# Patient Record
Sex: MALE | Race: ASIAN | NOT HISPANIC OR LATINO | URBAN - METROPOLITAN AREA
[De-identification: names, ages, dates, MRNs, and addresses within clinical notes are randomized per-mention and may not be internally consistent; named-entity substitution may affect disease eponyms.]

---

## 2020-10-25 ENCOUNTER — EMERGENCY (EMERGENCY)
Facility: HOSPITAL | Age: 60
LOS: 1 days | Discharge: HOME | End: 2020-10-25
Attending: EMERGENCY MEDICINE | Admitting: EMERGENCY MEDICINE
Payer: COMMERCIAL

## 2020-10-25 VITALS
TEMPERATURE: 99 F | SYSTOLIC BLOOD PRESSURE: 112 MMHG | OXYGEN SATURATION: 100 % | DIASTOLIC BLOOD PRESSURE: 78 MMHG | HEART RATE: 102 BPM | RESPIRATION RATE: 18 BRPM

## 2020-10-25 VITALS
RESPIRATION RATE: 16 BRPM | DIASTOLIC BLOOD PRESSURE: 76 MMHG | SYSTOLIC BLOOD PRESSURE: 117 MMHG | TEMPERATURE: 98 F | HEART RATE: 76 BPM | OXYGEN SATURATION: 100 %

## 2020-10-25 LAB
ALBUMIN SERPL ELPH-MCNC: 4.6 G/DL — SIGNIFICANT CHANGE UP (ref 3.3–5)
ALP SERPL-CCNC: 101 U/L — SIGNIFICANT CHANGE UP (ref 40–120)
ALT FLD-CCNC: 27 U/L — SIGNIFICANT CHANGE UP (ref 4–41)
ANION GAP SERPL CALC-SCNC: 13 MMO/L — SIGNIFICANT CHANGE UP (ref 7–14)
AST SERPL-CCNC: 17 U/L — SIGNIFICANT CHANGE UP (ref 4–40)
BASOPHILS # BLD AUTO: 0.04 K/UL — SIGNIFICANT CHANGE UP (ref 0–0.2)
BASOPHILS NFR BLD AUTO: 0.4 % — SIGNIFICANT CHANGE UP (ref 0–2)
BILIRUB SERPL-MCNC: 1.1 MG/DL — SIGNIFICANT CHANGE UP (ref 0.2–1.2)
BUN SERPL-MCNC: 24 MG/DL — HIGH (ref 7–23)
CALCIUM SERPL-MCNC: 9.3 MG/DL — SIGNIFICANT CHANGE UP (ref 8.4–10.5)
CHLORIDE SERPL-SCNC: 104 MMOL/L — SIGNIFICANT CHANGE UP (ref 98–107)
CO2 SERPL-SCNC: 22 MMOL/L — SIGNIFICANT CHANGE UP (ref 22–31)
CREAT SERPL-MCNC: 1.32 MG/DL — HIGH (ref 0.5–1.3)
EOSINOPHIL # BLD AUTO: 0.02 K/UL — SIGNIFICANT CHANGE UP (ref 0–0.5)
EOSINOPHIL NFR BLD AUTO: 0.2 % — SIGNIFICANT CHANGE UP (ref 0–6)
GLUCOSE SERPL-MCNC: 98 MG/DL — SIGNIFICANT CHANGE UP (ref 70–99)
HCT VFR BLD CALC: 48.8 % — SIGNIFICANT CHANGE UP (ref 39–50)
HGB BLD-MCNC: 15.7 G/DL — SIGNIFICANT CHANGE UP (ref 13–17)
IMM GRANULOCYTES NFR BLD AUTO: 0.3 % — SIGNIFICANT CHANGE UP (ref 0–1.5)
LYMPHOCYTES # BLD AUTO: 2.77 K/UL — SIGNIFICANT CHANGE UP (ref 1–3.3)
LYMPHOCYTES # BLD AUTO: 28.2 % — SIGNIFICANT CHANGE UP (ref 13–44)
MCHC RBC-ENTMCNC: 28.5 PG — SIGNIFICANT CHANGE UP (ref 27–34)
MCHC RBC-ENTMCNC: 32.2 % — SIGNIFICANT CHANGE UP (ref 32–36)
MCV RBC AUTO: 88.7 FL — SIGNIFICANT CHANGE UP (ref 80–100)
MONOCYTES # BLD AUTO: 0.53 K/UL — SIGNIFICANT CHANGE UP (ref 0–0.9)
MONOCYTES NFR BLD AUTO: 5.4 % — SIGNIFICANT CHANGE UP (ref 2–14)
NEUTROPHILS # BLD AUTO: 6.44 K/UL — SIGNIFICANT CHANGE UP (ref 1.8–7.4)
NEUTROPHILS NFR BLD AUTO: 65.5 % — SIGNIFICANT CHANGE UP (ref 43–77)
NRBC # FLD: 0 K/UL — SIGNIFICANT CHANGE UP (ref 0–0)
PLATELET # BLD AUTO: 252 K/UL — SIGNIFICANT CHANGE UP (ref 150–400)
PMV BLD: 10.6 FL — SIGNIFICANT CHANGE UP (ref 7–13)
POTASSIUM SERPL-MCNC: 4.2 MMOL/L — SIGNIFICANT CHANGE UP (ref 3.5–5.3)
POTASSIUM SERPL-SCNC: 4.2 MMOL/L — SIGNIFICANT CHANGE UP (ref 3.5–5.3)
PROT SERPL-MCNC: 7.6 G/DL — SIGNIFICANT CHANGE UP (ref 6–8.3)
RBC # BLD: 5.5 M/UL — SIGNIFICANT CHANGE UP (ref 4.2–5.8)
RBC # FLD: 12.7 % — SIGNIFICANT CHANGE UP (ref 10.3–14.5)
SODIUM SERPL-SCNC: 139 MMOL/L — SIGNIFICANT CHANGE UP (ref 135–145)
TROPONIN T, HIGH SENSITIVITY: 7 NG/L — SIGNIFICANT CHANGE UP (ref ?–14)
TROPONIN T, HIGH SENSITIVITY: 7 NG/L — SIGNIFICANT CHANGE UP (ref ?–14)
WBC # BLD: 9.83 K/UL — SIGNIFICANT CHANGE UP (ref 3.8–10.5)
WBC # FLD AUTO: 9.83 K/UL — SIGNIFICANT CHANGE UP (ref 3.8–10.5)

## 2020-10-25 PROCEDURE — 70498 CT ANGIOGRAPHY NECK: CPT | Mod: 26

## 2020-10-25 PROCEDURE — 99285 EMERGENCY DEPT VISIT HI MDM: CPT | Mod: 25

## 2020-10-25 PROCEDURE — 70496 CT ANGIOGRAPHY HEAD: CPT | Mod: 26

## 2020-10-25 PROCEDURE — 93010 ELECTROCARDIOGRAM REPORT: CPT

## 2020-10-25 RX ORDER — ATORVASTATIN CALCIUM 80 MG/1
1 TABLET, FILM COATED ORAL
Qty: 20 | Refills: 0
Start: 2020-10-25 | End: 2020-11-13

## 2020-10-25 RX ORDER — AZITHROMYCIN 500 MG/1
500 TABLET, FILM COATED ORAL ONCE
Refills: 0 | Status: DISCONTINUED | OUTPATIENT
Start: 2020-10-25 | End: 2020-10-25

## 2020-10-25 RX ORDER — CEFTRIAXONE 500 MG/1
1000 INJECTION, POWDER, FOR SOLUTION INTRAMUSCULAR; INTRAVENOUS ONCE
Refills: 0 | Status: DISCONTINUED | OUTPATIENT
Start: 2020-10-25 | End: 2020-10-25

## 2020-10-25 NOTE — ED PROVIDER NOTE - ATTENDING CONTRIBUTION TO CARE
Patient is a 59 yo M with history of HTN here for evaluation of left facial numbness and headache that started around 4 AM today. He states he felt a pulling sensation in his face, got up Patient is a 61 yo M with history of HTN here for evaluation of left facial numbness and headache that started around 4 AM today. He states he felt a pulling sensation in his face, got up and decided to go outside and run to get his blood flowing. Denies weakness in arms or legs. No change in speech or gait. He reports he doesn't usually get headaches. No vision changes. No fevers, chills, nausea, vomiting.     VS noted  Gen. no acute distress, Non toxic   HEENT: EOMI, mmm  Lungs: CTAB/L no C/ W /R   CVS: RRR   Abd; Soft non tender, non distended   Ext: no edema  Skin: no rash  Neuro AAOx3, CN 2-12 intact, strength equal b/l, gait normal, clear speech  a/p: facial droop? headache, pt with normal neuro exam. low suspicion for TIA/ CVA however patient is poor historian, cannot adequately say why he is on plavix. plan for ct head, cta head and neck. will check blood work to r/o electrolyte abnormalities.   - Karyna WAKEFIELD

## 2020-10-25 NOTE — ED ADULT TRIAGE NOTE - CHIEF COMPLAINT QUOTE
Pt reports having a left sided headache and feeling numbness to the left side of his starting at 4am when he woke up this morning (went to bed at 11pm last night). States the numbness is currently "less than it was before." No other neuro deficits noted in triage. States he also has had palpitations and left shoulder pain in the past few days as well. Hx of HTN. Dr. Fuentes called for code stroke eval. No code stroke called.

## 2020-10-25 NOTE — ED PROVIDER NOTE - PATIENT PORTAL LINK FT
You can access the FollowMyHealth Patient Portal offered by [facility] by registering at the following website: [email or web address]. By joining [facility]’s FollowMyHealth portal, you will also be able to view your health information using other applications (apps) compatible with our system.

## 2020-10-25 NOTE — CONSULT NOTE ADULT - ASSESSMENT
Patient is a 59yo M with pmhx of HTN who presented to the ED and neurology was consulted for complaint of headache. Onset of L sided headache followed by L facial numbness. Patient denies any associated vision changes or tinnitus. No hx of similar episodes or stroke. Symptoms improved with exercise. Patient reports resolution of all symptoms. Neuro exam is normal and non-focal. CT findings as below indicate intracranial athero.  L sided headache followed by numbness likely migraine. Low suspicion for ischemic intracranial pathology. L sided intracranial athero would not correlate with L sided symptoms. NIHSS 0. ABCD2 score 3, low risk for stroke    CT brain: There is no acute infarction, intracranial hemorrhage or mass effect.  CTA brain: There is approximately 40-50 % narrowing of the proximal M1 portion of the left MCA. There is no large vessel occlusion or aneurysm involving major proximal intracranial arteries.  CTA NECK: There is no stenosis involving major neck arteries.    Recommendations   - Tylenol prn headache   - No need for ASA at this time   - HbA1C, Lipid panel  - Atorvastatin 80mg, titrate for LDL < 70    Case to be discussed with neurology attending Dr. Holbrook Patient is a 61yo M with pmhx of HTN who presented to the ED and neurology was consulted for complaint of headache. Onset of L sided headache followed by L facial numbness. Patient denies any associated vision changes or tinnitus. No hx of similar episodes or stroke. Symptoms improved with exercise. Patient reports resolution of all symptoms. Neuro exam is normal and non-focal. CT findings as below indicate intracranial athero.  L sided headache followed by numbness likely migraine. Low suspicion for ischemic intracranial pathology. L sided intracranial athero would not correlate with L sided symptoms. NIHSS 0. ABCD2 score 3, low risk for stroke    CT brain: There is no acute infarction, intracranial hemorrhage or mass effect.  CTA brain: There is approximately 40-50 % narrowing of the proximal M1 portion of the left MCA. There is no large vessel occlusion or aneurysm involving major proximal intracranial arteries.  CTA NECK: There is no stenosis involving major neck arteries.    Recommendations   - Tylenol prn headache   - Would not start ASA at this time - can discuss with outpt neurology  - HbA1C, Lipid panel  - Atorvastatin 80mg, titrate for LDL < 70  - MRI brain without contrast - can be done outpatient  - Follow up with neurology outpatient. Dr Domínguez or Dr. Ledbetter [phone] WITHIN 1 week    Case discussed with neurology attending Dr. Holbrook

## 2020-10-25 NOTE — CONSULT NOTE ADULT - NSHPATTENDINGPLANDISCUSS_GEN_ALL_CORE
neurology on call resident on telephone and I agree with assessment and plan as outlined and patient feeling back to normal and will be started on statin and follow up with neurologist within 1 week.

## 2020-10-25 NOTE — CONSULT NOTE ADULT - SUBJECTIVE AND OBJECTIVE BOX
HPI:  Patient is a 59yo M with pmhx of HTN who presented to the ED and neurology was consulted for complaint of headache. Patient reports onset of L sided headache around 4am today. He was not woken up by it but states that he noticed it when he woke. Patient decided to go back to sleep. When he woke again at 7am he noticed L sided head and facial numbness with associated L neck pain. Patient states that he checked his blood pressure at home and it was normal. Patient decided to go for a run "to increase his blood flow" and states that his symptoms improved with exercise. He denies hx of similar symptoms or stroke. He denies hx of headache. Patient denies any associated vision changes, tinnitus, nausea, cp, sob, n/t or weakness of extremities. Patient states that symptoms were not remitted or exacerbated by anything else. He did not try any pain medication like tylenol or aleve. He states that he took 2 ASA 81mg when symptoms started. Symptoms persisted until 11am. He states that all of his symptoms have resolved now.     ROS: A 10-system ROS was performed and is negative except for those items noted above and/or in the HPI.    PAST MEDICAL & SURGICAL HISTORY:  Hypertension    No significant past surgical history      FAMILY HISTORY:  No pertinent family history in first degree relatives        SOCIAL HISTORY: SOCIAL HISTORY:     Marital Status: (  )   (  ) Single  (  )   (  )      Occupation:      Lives: (  ) alone  (  ) with children   (  ) with spouse  (  ) with parents  (  ) other     Illicit Drug Use: (  ) never used  (  ) other _____     Tobacco Use:  (  ) never smoked  (  ) former smoker  (  ) current smoker  (  ) pack year  (  ) last cigarette date     Alcohol Use:      Sexual History:        MEDICATIONS  Home Medications:      MEDICATIONS  (STANDING):    MEDICATIONS  (PRN):      ALLERGIES/INTOLERANCES:  Allergies  No Known Allergies    Intolerances      OBJECTIVE:  VITALS   Vital Signs Last 24 Hrs  T(C): 36.4 (25 Oct 2020 18:01), Max: 37.1 (25 Oct 2020 16:05)  T(F): 97.6 (25 Oct 2020 18:01), Max: 98.8 (25 Oct 2020 16:05)  HR: 76 (25 Oct 2020 18:01) (76 - 102)  BP: 117/76 (25 Oct 2020 18:01) (112/78 - 117/76)  BP(mean): --  RR: 16 (25 Oct 2020 18:01) (16 - 18)  SpO2: 100% (25 Oct 2020 18:01) (100% - 100%)    PHYSICAL EXAM:    General:  Constitutional: Obese Male, appears stated age, in no apparent distress including pain  Head: Normocephalic & atraumatic.    Neurological (>12):  MS: Awake, alert, oriented to person, place, situation, time. Normal affect. Follows all commands.    Language: Speech is clear, fluent with good repetition & comprehension (able to name objects: thumb, watch, glasses)    CNs: PERRLA (R = 4mm, L = 4mm). VFF. EOMI no nystagmus, no diplopia. V1-3 intact to LT b/l. No facial asymmetry b/l, full eye closure strength b/l. Hearing grossly normal (rubbing fingers) b/l. Symmetric palate elevation in midline. Gag reflex deferred. Head turning & shoulder shrug intact b/l. Tongue midline, normal movements, no atrophy.    Motor: Normal muscle bulk & tone. No noticeable tremor or seizure. No pronator drift.              Deltoid	Biceps	Triceps	   R	5	5	5	5	 	  L	5	5	5	5	    	H-Flex	K-Flex	K-Ext	D-Flex	P-Flex  R	5	5	5	5	5	  L	5	5	5	5	5		     Sensation: Intact to LT b/l throughout.     Cortical: Extinction on DSS (neglect): none    Reflexes:              Biceps(C5)       BR(C6)     Triceps(C7)               Patellar(L4)    Achilles(S1)    Plantar Resp  R	2	          2	             2		        2		    2		Down   L	2	          2	             2		        2		    2		Down     Coordination: No dysmetria to FTN    Gait: Normal Romberg. No postural instability. Normal stance and tandem gait.     LABORATORY:  CBC                       15.7   9.83  )-----------( 252      ( 25 Oct 2020 17:16 )             48.8     Chem 10-25    139  |  104  |  24<H>  ----------------------------<  98  4.2   |  22  |  1.32<H>    Ca    9.3      25 Oct 2020 17:16    TPro  7.6  /  Alb  4.6  /  TBili  1.1  /  DBili  x   /  AST  17  /  ALT  27  /  AlkPhos  101  10-25    LFTs LIVER FUNCTIONS - ( 25 Oct 2020 17:16 )  Alb: 4.6 g/dL / Pro: 7.6 g/dL / ALK PHOS: 101 u/L / ALT: 27 u/L / AST: 17 u/L / GGT: x

## 2020-10-25 NOTE — ED PROVIDER NOTE - OBJECTIVE STATEMENT
61 Y/O M PHN HTN C?O L sided HA that began at 4Am as well as tingling and spasm of the face. Pt denies arm or leg sensation. 59 Y/O M PHN HTN C/O L sided HA that began at 4Am as well as tingling and spasm of the face. Pt denies arm or leg sensation. Pt states he went to sleep feeling well at 11Pm. Pt states he took 2 aspirin and went for a run after experiencing the headache to "increase the bloodflow to the brain." Pt states the spasms to the L side of his face have improved and the headache has since resolved. Pt denies any other sx or acute complaints.

## 2020-10-25 NOTE — ED PROVIDER NOTE - CLINICAL SUMMARY MEDICAL DECISION MAKING FREE TEXT BOX
61 Y/O M PHN HTN C/O L sided HA that began at 4Am as well as tingling and spasm of the face. Pt denies arm or leg sensation. Pt states he went to sleep feeling well at 11Pm. Pt states he took 2 aspirin and went for a run after experiencing the headache to "increase the bloodflow to the brain."  Plan is CTA to R/O CVA or flow limiting stenosis. Pt is stable and well appearing.

## 2020-10-25 NOTE — ED PROVIDER NOTE - NSFOLLOWUPINSTRUCTIONS_ED_ALL_ED_FT
- Lab and imaging results, if performed, were discussed with you along with your discharge diagnosis    - Return to the ED for any new, worsening, or concerning symptoms to you including reoccurrence of your symptoms     - Continue all prescribed medications    -Neurology recommends outpatient MRI brain without contrast   - Please call to schedule follow up with neurology as outpatient with Dr Domínguez or Dr. Ledbetter, you may call [phone] to schedule an appointment WITHIN 1 week.     - Rest and keep yourself hydrated with fluids

## 2020-10-25 NOTE — ED ADULT NURSE REASSESSMENT NOTE - NS ED NURSE REASSESS COMMENT FT1
Received report from NITA Guzman. Pt is A&Ox4, ambulatory. Breathing even and unlabored. No complaints of chest pain, headache, nausea, dizziness, vomiting  SOB, fever or chills at this time. Waiting for neuro. Will continue to monitor.

## 2020-10-25 NOTE — ED ADULT NURSE NOTE - OBJECTIVE STATEMENT
pt c/o waking 4 am in morning with left sided headache that h relieved with exercise/ and facial tremor, denies weakness of any kind.  iv placed in rt ac labs sent off/ pt ambulatory at baseline/

## 2020-10-25 NOTE — ED PROVIDER NOTE - PROGRESS NOTE DETAILS
Luiz ISRAEL (PGY-2): Consulted Neuro and discussed CTA findings of narrowing of M1 region of L MCA with neuro and patient, pending neuro to see, patient remains asymptomatic on reassessment. Plan for likely dc home with further outpatient w/u with MRI. Luiz ISRAEL (PGY-2): Neuro recs outpatient MRI brain without contrast, and follow-up with neurology outpatient w/ Dr Domínguez or Dr. Ledbetter in one week.

## 2020-12-03 ENCOUNTER — EMERGENCY (EMERGENCY)
Facility: HOSPITAL | Age: 60
LOS: 1 days | Discharge: HOME | End: 2020-12-03
Attending: EMERGENCY MEDICINE | Admitting: EMERGENCY MEDICINE
Payer: COMMERCIAL

## 2020-12-03 VITALS
DIASTOLIC BLOOD PRESSURE: 82 MMHG | WEIGHT: 175.05 LBS | RESPIRATION RATE: 17 BRPM | SYSTOLIC BLOOD PRESSURE: 150 MMHG | HEART RATE: 95 BPM | HEIGHT: 68 IN | OXYGEN SATURATION: 99 % | TEMPERATURE: 98 F

## 2020-12-03 PROBLEM — I10 ESSENTIAL (PRIMARY) HYPERTENSION: Chronic | Status: ACTIVE | Noted: 2020-10-25

## 2020-12-03 LAB
ALBUMIN SERPL ELPH-MCNC: 4.5 G/DL — SIGNIFICANT CHANGE UP (ref 3.3–5)
ALP SERPL-CCNC: 101 U/L — SIGNIFICANT CHANGE UP (ref 40–120)
ALT FLD-CCNC: 22 U/L — SIGNIFICANT CHANGE UP (ref 4–41)
ANION GAP SERPL CALC-SCNC: 15 MMO/L — HIGH (ref 7–14)
AST SERPL-CCNC: 15 U/L — SIGNIFICANT CHANGE UP (ref 4–40)
BASOPHILS # BLD AUTO: 0.02 K/UL — SIGNIFICANT CHANGE UP (ref 0–0.2)
BASOPHILS NFR BLD AUTO: 0.2 % — SIGNIFICANT CHANGE UP (ref 0–2)
BILIRUB SERPL-MCNC: 1.6 MG/DL — HIGH (ref 0.2–1.2)
BUN SERPL-MCNC: 16 MG/DL — SIGNIFICANT CHANGE UP (ref 7–23)
CALCIUM SERPL-MCNC: 9.5 MG/DL — SIGNIFICANT CHANGE UP (ref 8.4–10.5)
CHLORIDE SERPL-SCNC: 103 MMOL/L — SIGNIFICANT CHANGE UP (ref 98–107)
CO2 SERPL-SCNC: 22 MMOL/L — SIGNIFICANT CHANGE UP (ref 22–31)
CREAT SERPL-MCNC: 1.03 MG/DL — SIGNIFICANT CHANGE UP (ref 0.5–1.3)
EOSINOPHIL # BLD AUTO: 0.01 K/UL — SIGNIFICANT CHANGE UP (ref 0–0.5)
EOSINOPHIL NFR BLD AUTO: 0.1 % — SIGNIFICANT CHANGE UP (ref 0–6)
GLUCOSE SERPL-MCNC: 112 MG/DL — HIGH (ref 70–99)
HCT VFR BLD CALC: 46.2 % — SIGNIFICANT CHANGE UP (ref 39–50)
HGB BLD-MCNC: 15.3 G/DL — SIGNIFICANT CHANGE UP (ref 13–17)
IMM GRANULOCYTES NFR BLD AUTO: 0.1 % — SIGNIFICANT CHANGE UP (ref 0–1.5)
LIDOCAIN IGE QN: 21.5 U/L — SIGNIFICANT CHANGE UP (ref 7–60)
LYMPHOCYTES # BLD AUTO: 2.19 K/UL — SIGNIFICANT CHANGE UP (ref 1–3.3)
LYMPHOCYTES # BLD AUTO: 25.8 % — SIGNIFICANT CHANGE UP (ref 13–44)
MCHC RBC-ENTMCNC: 29 PG — SIGNIFICANT CHANGE UP (ref 27–34)
MCHC RBC-ENTMCNC: 33.1 % — SIGNIFICANT CHANGE UP (ref 32–36)
MCV RBC AUTO: 87.7 FL — SIGNIFICANT CHANGE UP (ref 80–100)
MONOCYTES # BLD AUTO: 0.41 K/UL — SIGNIFICANT CHANGE UP (ref 0–0.9)
MONOCYTES NFR BLD AUTO: 4.8 % — SIGNIFICANT CHANGE UP (ref 2–14)
NEUTROPHILS # BLD AUTO: 5.86 K/UL — SIGNIFICANT CHANGE UP (ref 1.8–7.4)
NEUTROPHILS NFR BLD AUTO: 69 % — SIGNIFICANT CHANGE UP (ref 43–77)
NRBC # FLD: 0 K/UL — SIGNIFICANT CHANGE UP (ref 0–0)
PLATELET # BLD AUTO: 255 K/UL — SIGNIFICANT CHANGE UP (ref 150–400)
PMV BLD: 10.3 FL — SIGNIFICANT CHANGE UP (ref 7–13)
POTASSIUM SERPL-MCNC: 3.6 MMOL/L — SIGNIFICANT CHANGE UP (ref 3.5–5.3)
POTASSIUM SERPL-SCNC: 3.6 MMOL/L — SIGNIFICANT CHANGE UP (ref 3.5–5.3)
PROT SERPL-MCNC: 7.6 G/DL — SIGNIFICANT CHANGE UP (ref 6–8.3)
RBC # BLD: 5.27 M/UL — SIGNIFICANT CHANGE UP (ref 4.2–5.8)
RBC # FLD: 13.2 % — SIGNIFICANT CHANGE UP (ref 10.3–14.5)
SODIUM SERPL-SCNC: 140 MMOL/L — SIGNIFICANT CHANGE UP (ref 135–145)
TROPONIN T, HIGH SENSITIVITY: < 6 NG/L — SIGNIFICANT CHANGE UP (ref ?–14)
WBC # BLD: 8.5 K/UL — SIGNIFICANT CHANGE UP (ref 3.8–10.5)
WBC # FLD AUTO: 8.5 K/UL — SIGNIFICANT CHANGE UP (ref 3.8–10.5)

## 2020-12-03 PROCEDURE — 99283 EMERGENCY DEPT VISIT LOW MDM: CPT

## 2020-12-03 RX ORDER — FAMOTIDINE 10 MG/ML
20 INJECTION INTRAVENOUS ONCE
Refills: 0 | Status: COMPLETED | OUTPATIENT
Start: 2020-12-03 | End: 2020-12-03

## 2020-12-03 RX ORDER — SUCRALFATE 1 G
1 TABLET ORAL ONCE
Refills: 0 | Status: COMPLETED | OUTPATIENT
Start: 2020-12-03 | End: 2020-12-03

## 2020-12-03 RX ADMIN — FAMOTIDINE 20 MILLIGRAM(S): 10 INJECTION INTRAVENOUS at 09:55

## 2020-12-03 RX ADMIN — Medication 1 GRAM(S): at 09:56

## 2020-12-03 RX ADMIN — Medication 30 MILLILITER(S): at 09:56

## 2020-12-03 NOTE — ED ADULT NURSE NOTE - OBJECTIVE STATEMENT
Received pt to intake bed 1, A+Ox3, ambulatory. C/O abdominal pain post having a spicy meal last night, pt states he has had episodes of nausea and diarrhea, denies vomiting. pt admits to feeling palpitations with episode. Respirations even and unlabored, normal work of breathing, no accessory muscle use, speaking in full clear uninterrupted sentences. ABD is soft, non tender, non distended with normal active bowel sounds. Pt denies any chest pain, SOB, headache, dizziness, fever, chills. 20G to AC, Labs sent, Medicated as per MD, will continue to monitor.

## 2020-12-03 NOTE — ED PROVIDER NOTE - OBJECTIVE STATEMENT
61 y/o M with PMHx fo GERD, HTN, and HLD scheduled for EGG in 5 days presents to ED after having multiple episodes of post-prandial abdominal pain associated with nausea, palpitations, and a "bitter" taste in mouth. No overt chest pain or SOB during the episodes. Pain is non-migrating and non-exertional. Pt is worried that he may have stomach cancer.

## 2020-12-03 NOTE — ED ADULT TRIAGE NOTE - CHIEF COMPLAINT QUOTE
Pt c/o abdominal pain, nausea, diarrhea and a "bitter" taste in his mouth intermittently X1week. Denies chest pain, SOB, fever, chills.

## 2020-12-03 NOTE — ED PROVIDER NOTE - CLINICAL SUMMARY MEDICAL DECISION MAKING FREE TEXT BOX
61 y/o M presents to ED with signs and symptoms consistent with GERD.  Not consistent with gallbladder disease and very low suspicion for ACS. EKG without any ischemic changes. Plan to check basic labs and treat for GERD symptoms. Pt has follow up for EGG in 5 days.

## 2020-12-03 NOTE — ED PROVIDER NOTE - NSFOLLOWUPINSTRUCTIONS_ED_ALL_ED_FT
1. TAKE ALL OF YOUR PRESCRIBED OR OVER THE COUNTER MEDICATIONS AS DIRECTED.    2. FOR PAIN OR FEVER YOU CAN TAKE IBUPROFEN (MOTRIN, ADVIL), NAPROXEN (ALLEVE) OR ACETAMINOPHEN (TYLENOL) AS NEEDED, AS DIRECTED ON PACKAGING.  3. FOLLOW UP WITH YOUR GASTROENTEROLOGIST FOR YOUR SCOPE PROCEDURE IN 5 DAYS AS SCHEDULED.  4. IF YOU HAD LABS OR IMAGING DONE, YOU WERE GIVEN COPIES OF ALL LABS AND/OR IMAGING RESULTS FROM YOUR ER VISIT--PLEASE TAKE THEM WITH YOU TO YOUR FOLLOW UP APPOINTMENTS.  5. IF NEEDED, CALL PATIENT ACCESS SERVICES AT [phone] TO FIND A PRIMARY CARE PHYSICIAN.  OR CALL [phone] TO MAKE AN APPOINTMENT WITH THE CLINIC.  6. YOU CAN FIND PHYSICIANS OF ALL SPECIALITIES BY VISITING [facility] AND CLICKING ON "FIND A DOCTOR".  7. RETURN TO THE ER FOR ANY WORSENING SYMPTOMS OR CONCERNS.    THANK YOU FOR COMING TO [facility].  HAVE A NICE DAY.

## 2020-12-03 NOTE — ED PROVIDER NOTE - PROGRESS NOTE DETAILS
Dr. Washburn: pt noting feeling significant improvement; CBC WNL, awaiting remainder of labs Dr. Washburn: pt feeling well; labs WNL, no acute findings; trop negative x 1 and sufficient given length of time pain present today; will discharge with instructions to continue taking GI meds that he was prescribed by his doctor, and follow up for his EGD in 5 days as scheduled.  Encouraged small meals.

## 2020-12-03 NOTE — ED PROVIDER NOTE - PATIENT PORTAL LINK FT
You can access the FollowMyHealth Patient Portal offered by [facility] by registering at the following website: [email or web address]. By joining [facility]’s FollowMyHealth portal, you will also be able to view your health information using other applications (apps) compatible with our system.

## 2022-08-24 PROBLEM — K21.9 GASTRO-ESOPHAGEAL REFLUX DISEASE WITHOUT ESOPHAGITIS: Chronic | Status: ACTIVE | Noted: 2020-12-03

## 2022-08-24 PROBLEM — E78.5 HYPERLIPIDEMIA, UNSPECIFIED: Chronic | Status: ACTIVE | Noted: 2020-12-03

## 2022-09-15 PROBLEM — Z00.00 ENCOUNTER FOR PREVENTIVE HEALTH EXAMINATION: Status: ACTIVE | Noted: 2022-09-15

## 2022-09-20 ENCOUNTER — APPOINTMENT (OUTPATIENT)
Dept: GASTROENTEROLOGY | Facility: CLINIC | Age: 62
End: 2022-09-20

## 2022-09-20 VITALS
WEIGHT: 178 LBS | SYSTOLIC BLOOD PRESSURE: 132 MMHG | HEIGHT: 70 IN | HEART RATE: 93 BPM | DIASTOLIC BLOOD PRESSURE: 84 MMHG | OXYGEN SATURATION: 97 % | TEMPERATURE: 97.6 F | RESPIRATION RATE: 17 BRPM | BODY MASS INDEX: 25.48 KG/M2

## 2022-09-20 DIAGNOSIS — Z78.9 OTHER SPECIFIED HEALTH STATUS: ICD-10-CM

## 2022-09-20 DIAGNOSIS — Z82.5 FAMILY HISTORY OF ASTHMA AND OTHER CHRONIC LOWER RESPIRATORY DISEASES: ICD-10-CM

## 2022-09-20 DIAGNOSIS — K29.50 UNSPECIFIED CHRONIC GASTRITIS W/OUT BLEEDING: ICD-10-CM

## 2022-09-20 DIAGNOSIS — K21.9 GASTRO-ESOPHAGEAL REFLUX DISEASE W/OUT ESOPHAGITIS: ICD-10-CM

## 2022-09-20 DIAGNOSIS — Z82.49 FAMILY HISTORY OF ISCHEMIC HEART DISEASE AND OTHER DISEASES OF THE CIRCULATORY SYSTEM: ICD-10-CM

## 2022-09-20 DIAGNOSIS — B96.81 GASTRITIS, UNSPECIFIED, W/OUT BLEEDING: ICD-10-CM

## 2022-09-20 DIAGNOSIS — Z86.010 PERSONAL HISTORY OF COLONIC POLYPS: ICD-10-CM

## 2022-09-20 DIAGNOSIS — R14.2 ERUCTATION: ICD-10-CM

## 2022-09-20 DIAGNOSIS — K29.70 GASTRITIS, UNSPECIFIED, W/OUT BLEEDING: ICD-10-CM

## 2022-09-20 DIAGNOSIS — I10 ESSENTIAL (PRIMARY) HYPERTENSION: ICD-10-CM

## 2022-09-20 DIAGNOSIS — R14.0 ABDOMINAL DISTENSION (GASEOUS): ICD-10-CM

## 2022-09-20 PROCEDURE — 99204 OFFICE O/P NEW MOD 45 MIN: CPT

## 2022-09-20 RX ORDER — OMEPRAZOLE 20 MG/1
20 CAPSULE, DELAYED RELEASE ORAL
Refills: 0 | Status: ACTIVE | COMMUNITY

## 2022-09-20 RX ORDER — OMEPRAZOLE 40 MG/1
40 CAPSULE, DELAYED RELEASE ORAL
Qty: 30 | Refills: 3 | Status: ACTIVE | COMMUNITY
Start: 2022-09-20 | End: 1900-01-01

## 2022-09-20 RX ORDER — FAMOTIDINE 40 MG/1
40 TABLET, FILM COATED ORAL
Qty: 30 | Refills: 3 | Status: ACTIVE | COMMUNITY
Start: 2022-09-20 | End: 1900-01-01

## 2022-09-20 RX ORDER — SUCRALFATE 1 G/1
1 TABLET ORAL 3 TIMES DAILY
Qty: 90 | Refills: 1 | Status: ACTIVE | COMMUNITY
Start: 2022-09-20 | End: 1900-01-01

## 2022-09-20 RX ORDER — OLMESARTAN MEDOXOMIL / AMLODIPINE BESYLATE / HYDROCHLOROTHIAZIDE 10; 25; 40 MG/1; MG/1; MG/1
40-10-25 TABLET, FILM COATED ORAL
Refills: 0 | Status: ACTIVE | COMMUNITY

## 2022-09-20 RX ORDER — ATORVASTATIN CALCIUM 20 MG/1
20 TABLET, FILM COATED ORAL
Refills: 0 | Status: ACTIVE | COMMUNITY

## 2022-09-20 NOTE — PHYSICAL EXAM
[Alert] : alert [Normal Voice/Communication] : normal voice/communication [Healthy Appearing] : healthy appearing [No Acute Distress] : no acute distress [Sclera] : the sclera and conjunctiva were normal [Hearing Threshold Finger Rub Not [place]] : hearing was normal [Normal Lips/Gums] : the lips and gums were normal [Oropharynx] : the oropharynx was normal [Normal Appearance] : the appearance of the neck was normal [No Neck Mass] : no neck mass was observed [No Respiratory Distress] : no respiratory distress [No Acc Muscle Use] : no accessory muscle use [Respiration, Rhythm And Depth] : normal respiratory rhythm and effort [Auscultation Breath Sounds / Voice Sounds] : lungs were clear to auscultation bilaterally [Heart Rate And Rhythm] : heart rate was normal and rhythm regular [Normal S1, S2] : normal S1 and S2 [Murmurs] : no murmurs [Bowel Sounds] : normal bowel sounds [Abdomen Tenderness] : non-tender [No Masses] : no abdominal mass palpated [Abdomen Soft] : soft [] : no hepatosplenomegaly [No CVA Tenderness] : no CVA  tenderness [No Spinal Tenderness] : no spinal tenderness [Abnormal Walk] : normal gait [Oriented To Time, Place, And Person] : oriented to person, place, and time

## 2022-09-20 NOTE — ASSESSMENT
[FreeTextEntry1] : Patient with history of GERD and H. pylori.  He had an upper endoscopy in 2020 that revealed moderate gastritis.  He was treated for H. pylori which was eradicated.  He no longer has heartburn or regurgitation but continues to have excessive belching which is mostly postprandial.  This seems to affect his daily routine.\par We will increase his omeprazole to 40 mg in the morning, give famotidine 40 mg in the evening, and add sucralfate 3 times daily.  We will see how this controls her symptoms.  If the symptoms persist, he will likely need an upper endoscopy.

## 2022-09-20 NOTE — REASON FOR VISIT
[Initial Evaluation] : an initial evaluation [FreeTextEntry1] : Hx of colon polyps, Excessive belching, Hx of H.Pylori, Gastritis, GERD

## 2022-09-20 NOTE — HISTORY OF PRESENT ILLNESS
[FreeTextEntry1] : Patient is a 61-year-old gentleman who presents with excessive burping.  This predominantly occurs postprandially and has been present for several months.  He had an upper endoscopy in 2020 elsewhere that revealed gastritis and H. pylori was positive.  He was initially treated with 10 days of antibiotics without eradication and this was followed by a 14-day course of treatment.  Apparently breath test 2 months ago was negative.  He takes omeprazole 20 mg daily with only mild relief of his belching.  He has no heartburn any longer and no regurgitation and no dysphagia.\par Patient also had a colonoscopy in 2020 and had 2 polyps removed.  These were tubular adenomas.  His bowel movements are regular.

## 2022-12-20 ENCOUNTER — APPOINTMENT (OUTPATIENT)
Dept: GASTROENTEROLOGY | Facility: CLINIC | Age: 62
End: 2022-12-20

## 2025-01-28 ENCOUNTER — APPOINTMENT (OUTPATIENT)
Dept: UROLOGY | Facility: CLINIC | Age: 65
End: 2025-01-28